# Patient Record
Sex: FEMALE | Race: WHITE | Employment: FULL TIME | URBAN - METROPOLITAN AREA
[De-identification: names, ages, dates, MRNs, and addresses within clinical notes are randomized per-mention and may not be internally consistent; named-entity substitution may affect disease eponyms.]

---

## 2022-07-16 ENCOUNTER — HOSPITAL ENCOUNTER (EMERGENCY)
Facility: CLINIC | Age: 22
Discharge: HOME | End: 2022-07-16
Attending: INTERNAL MEDICINE | Admitting: INTERNAL MEDICINE
Payer: COMMERCIAL

## 2022-07-16 VITALS
WEIGHT: 100 LBS | DIASTOLIC BLOOD PRESSURE: 75 MMHG | SYSTOLIC BLOOD PRESSURE: 102 MMHG | TEMPERATURE: 98.2 F | HEART RATE: 82 BPM | BODY MASS INDEX: 17.07 KG/M2 | RESPIRATION RATE: 16 BRPM | HEIGHT: 64 IN | OXYGEN SATURATION: 100 %

## 2022-07-16 DIAGNOSIS — K13.0 LIP LESION: ICD-10-CM

## 2022-07-16 PROCEDURE — 99282 EMERGENCY DEPT VISIT SF MDM: CPT | Performed by: INTERNAL MEDICINE

## 2022-07-16 PROCEDURE — 99284 EMERGENCY DEPT VISIT MOD MDM: CPT | Performed by: INTERNAL MEDICINE

## 2022-07-16 RX ORDER — OMEGA-3 FATTY ACIDS/FISH OIL 300-1000MG
CAPSULE ORAL
COMMUNITY
Start: 2022-06-03

## 2022-07-16 RX ORDER — CEPHALEXIN 500 MG/1
500 CAPSULE ORAL 4 TIMES DAILY
Qty: 28 CAPSULE | Refills: 0 | Status: SHIPPED | OUTPATIENT
Start: 2022-07-16 | End: 2022-07-23

## 2022-07-16 RX ORDER — MULTIPLE VITAMINS W/ MINERALS TAB 9MG-400MCG
TAB ORAL EVERY 24 HOURS
COMMUNITY
Start: 2021-03-15

## 2022-07-16 ASSESSMENT — ENCOUNTER SYMPTOMS
WOUND: 1
CONFUSION: 0
ARTHRALGIAS: 0
DIFFICULTY URINATING: 0
NECK STIFFNESS: 0
HEADACHES: 0
ABDOMINAL PAIN: 0
COLOR CHANGE: 0
FEVER: 0
EYE REDNESS: 0
SHORTNESS OF BREATH: 0

## 2022-07-17 NOTE — ED TRIAGE NOTES
Patient arrives to triage ambulatory with friend from wedding.  Patient states she had a small cut which started swelling the next day with green puss and a red spot next to it. She thinks it could be infection. Mild stinging pain. Three days ago she thinks its a blunt injury, she states she may have bit it, she is not sure how it got cut. Patient stated she believes she gave oral sex after getting the inside of her upper lip on the left cut which she thinks may have exposed it to bacteria. The green puss started the day after the cut appeared.   VSS

## 2022-07-17 NOTE — ED PROVIDER NOTES
[facility] DEPARTMENT ([facility])  July 16, 2022    History     Chief Complaint   Patient presents with     Lip Laceration     HPI  Prabha Reich is a 22 year old female with no significant past medical history who presents to the Emergency Department with a lip laceration. Patient reports that 3 days ago she sustained a cut on her upper lip from either biting it or running into something. Since then, she endorses green pus from the cut, as well as erythema and swelling. Denies prior history of similar symptoms. She wants to make sure that it does not worsen since it is on her face, and feels that it has progressively worsened over the last 3 days and is not improving. Denies rash. Denies itchiness of lip.    Past Medical History  History reviewed. No pertinent past medical history.  History reviewed. No pertinent surgical history.  cephALEXin (KEFLEX) 500 MG capsule  diltiazem 2% in lipovan cream 2% GEL  ibuprofen (ADVIL/MOTRIN) 200 MG capsule  multivitamin w/minerals (MULTI-VITAMIN) tablet      No Known Allergies  Family History  History reviewed. No pertinent family history.  Social History   Social History     Tobacco Use     Smoking status: Never Smoker     Smokeless tobacco: Never Used   Substance Use Topics     Alcohol use: Not Currently     Drug use: Never      Past medical history, past surgical history, medications, allergies, family history, and social history were reviewed with the patient. No additional pertinent items.       Review of Systems   Constitutional: Negative for fever.   HENT: Negative for congestion.    Eyes: Negative for redness.   Respiratory: Negative for shortness of breath.    Cardiovascular: Negative for chest pain.   Gastrointestinal: Negative for abdominal pain.   Genitourinary: Negative for difficulty urinating.   Musculoskeletal: Negative for arthralgias and neck stiffness.   Skin: Positive for wound (lip laceration). Negative for color change.  "  Neurological: Negative for headaches.   Psychiatric/Behavioral: Negative for confusion.   All other systems reviewed and are negative.    A complete review of systems was performed with pertinent positives and negatives noted in the HPI, and all other systems negative.    Physical Exam   BP: 113/75  Pulse: 61  Temp: 98.2  F (36.8  C)  Resp: 18  Height: 162.6 cm (5' 4\")  Weight: 45.4 kg (100 lb)  SpO2: 100 %  Physical Exam  Constitutional:       General: She is not in acute distress.     Appearance: She is not diaphoretic.   HENT:      Head: Atraumatic.      Mouth/Throat:      Mouth: Mucous membranes are moist. Oral lesions present. No injury, lacerations or angioedema.      Pharynx: No oropharyngeal exudate.     Eyes:      General: No scleral icterus.     Pupils: Pupils are equal, round, and reactive to light.   Cardiovascular:      Rate and Rhythm: Normal rate and regular rhythm.      Heart sounds: Normal heart sounds. No murmur heard.    No friction rub. No gallop.   Pulmonary:      Effort: Pulmonary effort is normal. No respiratory distress.      Breath sounds: Normal breath sounds. No stridor. No wheezing, rhonchi or rales.   Chest:      Chest wall: No tenderness.   Abdominal:      General: Abdomen is flat. Bowel sounds are normal. There is no distension.      Palpations: Abdomen is soft. There is no mass.      Tenderness: There is no abdominal tenderness. There is no right CVA tenderness, left CVA tenderness, guarding or rebound.      Hernia: No hernia is present.   Musculoskeletal:         General: No tenderness.      Cervical back: Neck supple.   Lymphadenopathy:      Cervical: No cervical adenopathy.   Skin:     General: Skin is warm.      Findings: No rash.         ED Course     9:54 PM  The patient was seen and examined by Maxwell Kitchen MD in Room ED06.    Procedures                     No results found for any visits on 07/16/22.  Medications - No data to display     Assessments & Plan (with Medical " Decision Making)  Acute left upper lip lesion, ?trauma, no previous STI or significant PMHx, DDx includes impetigo, HSV, others, in view of not much tingling, opting to treat possible early impetigo, will discharge with keflex and follow up with her PMD if no improvements in 3-7 days.       I have reviewed the nursing notes. I have reviewed the findings, diagnosis, plan and need for follow up with the patient.    New Prescriptions    CEPHALEXIN (KEFLEX) 500 MG CAPSULE    Take 1 capsule (500 mg) by mouth 4 times daily for 7 days       Final diagnoses:   Lip lesion     I, Michelle Aguila, am serving as a trained medical scribe to document services personally performed by Maxwell Kitchen MD, based on the provider's statements to me.   I, Maxwell Kitchen MD, was physically present and have reviewed and verified the accuracy of this note documented by Michelle Aguila.    --  Maxwell Kitchen MD  [facility] EMERGENCY DEPARTMENT  7/16/2022     Maxwell Kitchen MD  07/16/22 [id number]

## 2022-07-17 NOTE — DISCHARGE INSTRUCTIONS
Please start medication prescribed and make an appointment to follow up with Your Primary Care Provider in 3-7 days if not improving.